# Patient Record
Sex: MALE | Race: BLACK OR AFRICAN AMERICAN | NOT HISPANIC OR LATINO
[De-identification: names, ages, dates, MRNs, and addresses within clinical notes are randomized per-mention and may not be internally consistent; named-entity substitution may affect disease eponyms.]

---

## 2018-03-05 PROBLEM — Z00.00 ENCOUNTER FOR PREVENTIVE HEALTH EXAMINATION: Status: ACTIVE | Noted: 2018-03-05

## 2018-03-06 ENCOUNTER — APPOINTMENT (OUTPATIENT)
Dept: UROLOGY | Facility: CLINIC | Age: 53
End: 2018-03-06

## 2018-04-17 ENCOUNTER — APPOINTMENT (OUTPATIENT)
Dept: UROLOGY | Facility: CLINIC | Age: 53
End: 2018-04-17
Payer: MEDICAID

## 2018-04-17 PROCEDURE — 99204 OFFICE O/P NEW MOD 45 MIN: CPT

## 2018-04-17 RX ORDER — SILDENAFIL 20 MG/1
20 TABLET ORAL
Qty: 50 | Refills: 2 | Status: ACTIVE | COMMUNITY
Start: 2018-04-17 | End: 1900-01-01

## 2018-04-17 RX ORDER — ENALAPRIL MALEATE 10 MG/1
10 TABLET ORAL
Qty: 30 | Refills: 0 | Status: ACTIVE | COMMUNITY
Start: 2018-03-22

## 2018-04-17 RX ORDER — METFORMIN HYDROCHLORIDE 500 MG/1
500 TABLET, COATED ORAL
Qty: 270 | Refills: 0 | Status: ACTIVE | COMMUNITY
Start: 2018-03-28

## 2018-04-17 RX ORDER — TAMSULOSIN HYDROCHLORIDE 0.4 MG/1
0.4 CAPSULE ORAL
Qty: 90 | Refills: 1 | Status: ACTIVE | COMMUNITY
Start: 2018-04-17 | End: 1900-01-01

## 2018-04-17 RX ORDER — ENALAPRIL MALEATE 2.5 MG/1
2.5 TABLET ORAL
Qty: 30 | Refills: 0 | Status: ACTIVE | COMMUNITY
Start: 2018-02-28

## 2018-04-17 RX ORDER — SIMVASTATIN 20 MG/1
20 TABLET, FILM COATED ORAL
Qty: 30 | Refills: 0 | Status: ACTIVE | COMMUNITY
Start: 2018-02-28

## 2018-04-19 LAB
APPEARANCE: CLEAR
BACTERIA: NEGATIVE
BILIRUBIN URINE: NEGATIVE
BLOOD URINE: NEGATIVE
COLOR: YELLOW
GLUCOSE QUALITATIVE U: 1000 MG/DL
HYALINE CASTS: 1 /LPF
KETONES URINE: NEGATIVE
LEUKOCYTE ESTERASE URINE: NEGATIVE
MICROSCOPIC-UA: NORMAL
NITRITE URINE: NEGATIVE
PH URINE: 6.5
PROTEIN URINE: ABNORMAL MG/DL
PSA SERPL-MCNC: 0.34 NG/ML
RED BLOOD CELLS URINE: 4 /HPF
SPECIFIC GRAVITY URINE: 1.03
SQUAMOUS EPITHELIAL CELLS: 2 /HPF
UROBILINOGEN URINE: NEGATIVE MG/DL
WHITE BLOOD CELLS URINE: 1 /HPF

## 2018-05-22 ENCOUNTER — TRANSCRIPTION ENCOUNTER (OUTPATIENT)
Age: 53
End: 2018-05-22

## 2018-05-23 ENCOUNTER — OUTPATIENT (OUTPATIENT)
Dept: OUTPATIENT SERVICES | Facility: HOSPITAL | Age: 53
LOS: 1 days | End: 2018-05-23
Payer: MEDICAID

## 2018-05-23 VITALS
TEMPERATURE: 98 F | HEART RATE: 60 BPM | SYSTOLIC BLOOD PRESSURE: 147 MMHG | RESPIRATION RATE: 18 BRPM | DIASTOLIC BLOOD PRESSURE: 85 MMHG | OXYGEN SATURATION: 100 %

## 2018-05-23 VITALS
OXYGEN SATURATION: 100 % | WEIGHT: 192.9 LBS | HEIGHT: 69 IN | RESPIRATION RATE: 16 BRPM | TEMPERATURE: 99 F | DIASTOLIC BLOOD PRESSURE: 87 MMHG | HEART RATE: 86 BPM | SYSTOLIC BLOOD PRESSURE: 142 MMHG

## 2018-05-23 DIAGNOSIS — K42.9 UMBILICAL HERNIA WITHOUT OBSTRUCTION OR GANGRENE: ICD-10-CM

## 2018-05-23 LAB
GLUCOSE BLDC GLUCOMTR-MCNC: 112 MG/DL — HIGH (ref 70–99)
GLUCOSE BLDC GLUCOMTR-MCNC: 83 MG/DL — SIGNIFICANT CHANGE UP (ref 70–99)

## 2018-05-23 PROCEDURE — 82962 GLUCOSE BLOOD TEST: CPT

## 2018-05-23 PROCEDURE — 49585: CPT

## 2018-05-23 RX ORDER — ACETAMINOPHEN 500 MG
1000 TABLET ORAL ONCE
Qty: 0 | Refills: 0 | Status: DISCONTINUED | OUTPATIENT
Start: 2018-05-23 | End: 2018-05-24

## 2018-05-23 RX ORDER — FENTANYL CITRATE 50 UG/ML
25 INJECTION INTRAVENOUS
Qty: 0 | Refills: 0 | Status: DISCONTINUED | OUTPATIENT
Start: 2018-05-23 | End: 2018-05-24

## 2018-05-23 NOTE — H&P PST ADULT - HISTORY OF PRESENT ILLNESS
54 y/o w/ PMH DM2. HTN, BPH, DLD presents to [facility] for elective surgery for symptomatic umbilical hernia.  Patient states he has been having chronic periumbilical pain for many years and was diagnosed recently with an umbilical hernia. Patient brought CT scan report. Denies currently any abdominal pain, nausea, vomiting, changes in appetite or bowel function.   ALL: NKDA  PMH; As above  PSH: RIH repair

## 2018-05-23 NOTE — ASU DISCHARGE PLAN (ADULT/PEDIATRIC). - MEDICATION SUMMARY - MEDICATIONS TO TAKE
I will START or STAY ON the medications listed below when I get home from the hospital:    oxyCODONE-acetaminophen 5 mg-325 mg oral tablet  -- 1 tab(s) by mouth every 6 hours, As Needed -for moderate pain MDD:4   -- Caution federal law prohibits the transfer of this drug to any person other  than the person for whom it was prescribed.  May cause drowsiness.  Alcohol may intensify this effect.  Use care when operating dangerous machinery.  This prescription cannot be refilled.  This product contains acetaminophen.  Do not use  with any other product containing acetaminophen to prevent possible liver damage.  Using more of this medication than prescribed may cause serious breathing problems.    -- Indication: For Pain    enalapril 10 mg oral tablet  -- 1 tab(s) by mouth once a day  -- Indication: For .    tamsulosin 0.4 mg oral capsule  -- 1 cap(s) by mouth once a day  -- Indication: For .    metFORMIN 1000 mg oral tablet  -- 1 tab(s) by mouth 2 times a day  -- Indication: For .    simvastatin 20 mg oral tablet  -- 1 tab(s) by mouth once a day (at bedtime)  -- Indication: For .    prednisoLONE acetate 1% ophthalmic suspension  -- 1 drop(s) to each affected eye 4 times a day  -- Indication: For .

## 2018-05-23 NOTE — ASU DISCHARGE PLAN (ADULT/PEDIATRIC). - NOTIFY
Persistent Nausea and Vomiting/Unable to Urinate/Bleeding that does not stop/Swelling that continues/Numbness, color, or temperature change to extremity/Excessive Diarrhea/Pain not relieved by Medications/Fever greater than 101/Numbness, tingling

## 2018-05-23 NOTE — BRIEF OPERATIVE NOTE - PROCEDURE
<<-----Click on this checkbox to enter Procedure Umbilical hernia repair with mesh  05/23/2018    Active  MCLERVEUS Umbilical hernia repair  05/23/2018    Active  KIMIUS

## 2018-06-19 ENCOUNTER — APPOINTMENT (OUTPATIENT)
Dept: ORTHOPEDIC SURGERY | Facility: CLINIC | Age: 53
End: 2018-06-19
Payer: MEDICAID

## 2018-06-19 VITALS
BODY MASS INDEX: 29.18 KG/M2 | HEART RATE: 78 BPM | WEIGHT: 197 LBS | SYSTOLIC BLOOD PRESSURE: 130 MMHG | DIASTOLIC BLOOD PRESSURE: 71 MMHG | HEIGHT: 69 IN

## 2018-06-19 DIAGNOSIS — M19.049 PRIMARY OSTEOARTHRITIS, UNSPECIFIED HAND: ICD-10-CM

## 2018-06-19 PROCEDURE — 99203 OFFICE O/P NEW LOW 30 MIN: CPT | Mod: 25

## 2018-06-19 PROCEDURE — 20550 NJX 1 TENDON SHEATH/LIGAMENT: CPT | Mod: RT

## 2018-06-25 ENCOUNTER — APPOINTMENT (OUTPATIENT)
Dept: ORTHOPEDIC SURGERY | Facility: CLINIC | Age: 53
End: 2018-06-25

## 2018-07-26 ENCOUNTER — APPOINTMENT (OUTPATIENT)
Dept: UROLOGY | Facility: CLINIC | Age: 53
End: 2018-07-26
Payer: MEDICAID

## 2018-07-26 VITALS
RESPIRATION RATE: 16 BRPM | BODY MASS INDEX: 29.33 KG/M2 | SYSTOLIC BLOOD PRESSURE: 146 MMHG | DIASTOLIC BLOOD PRESSURE: 72 MMHG | HEART RATE: 74 BPM | HEIGHT: 69 IN | WEIGHT: 198 LBS

## 2018-07-26 PROCEDURE — 99213 OFFICE O/P EST LOW 20 MIN: CPT

## 2018-09-28 ENCOUNTER — APPOINTMENT (OUTPATIENT)
Dept: ORTHOPEDIC SURGERY | Facility: CLINIC | Age: 53
End: 2018-09-28
Payer: MEDICAID

## 2018-09-28 VITALS
HEART RATE: 65 BPM | WEIGHT: 198 LBS | HEIGHT: 69 IN | SYSTOLIC BLOOD PRESSURE: 131 MMHG | DIASTOLIC BLOOD PRESSURE: 78 MMHG | BODY MASS INDEX: 29.33 KG/M2

## 2018-09-28 DIAGNOSIS — M65.311 TRIGGER THUMB, RIGHT THUMB: ICD-10-CM

## 2018-09-28 DIAGNOSIS — M65.321 TRIGGER FINGER, RIGHT INDEX FINGER: ICD-10-CM

## 2018-09-28 PROCEDURE — 99213 OFFICE O/P EST LOW 20 MIN: CPT | Mod: 25

## 2018-09-28 PROCEDURE — 20550 NJX 1 TENDON SHEATH/LIGAMENT: CPT | Mod: RT

## 2018-09-28 RX ORDER — PREDNISONE 10 MG
TABLET ORAL
Refills: 0 | Status: ACTIVE | COMMUNITY

## 2018-09-28 RX ORDER — TAMSULOSIN HYDROCHLORIDE 0.4 MG/1
0.4 CAPSULE ORAL
Refills: 0 | Status: ACTIVE | COMMUNITY

## 2018-09-28 RX ORDER — ASPIRIN 325 MG/1
TABLET, FILM COATED ORAL
Refills: 0 | Status: ACTIVE | COMMUNITY

## 2019-02-26 ENCOUNTER — APPOINTMENT (OUTPATIENT)
Dept: UROLOGY | Facility: CLINIC | Age: 54
End: 2019-02-26

## 2020-08-17 ENCOUNTER — APPOINTMENT (OUTPATIENT)
Dept: DISASTER EMERGENCY | Facility: CLINIC | Age: 55
End: 2020-08-17

## 2020-08-17 DIAGNOSIS — Z01.818 ENCOUNTER FOR OTHER PREPROCEDURAL EXAMINATION: ICD-10-CM

## 2020-08-20 ENCOUNTER — OUTPATIENT (OUTPATIENT)
Dept: OUTPATIENT SERVICES | Facility: HOSPITAL | Age: 55
LOS: 1 days | Discharge: HOME | End: 2020-08-20
Payer: MEDICAID

## 2020-08-20 VITALS
TEMPERATURE: 98 F | DIASTOLIC BLOOD PRESSURE: 72 MMHG | OXYGEN SATURATION: 98 % | SYSTOLIC BLOOD PRESSURE: 142 MMHG | HEIGHT: 69 IN | WEIGHT: 195.11 LBS

## 2020-08-20 VITALS
HEART RATE: 68 BPM | OXYGEN SATURATION: 97 % | DIASTOLIC BLOOD PRESSURE: 68 MMHG | SYSTOLIC BLOOD PRESSURE: 124 MMHG | RESPIRATION RATE: 19 BRPM

## 2020-08-20 DIAGNOSIS — Z98.890 OTHER SPECIFIED POSTPROCEDURAL STATES: Chronic | ICD-10-CM

## 2020-08-20 DIAGNOSIS — R94.39 ABNORMAL RESULT OF OTHER CARDIOVASCULAR FUNCTION STUDY: ICD-10-CM

## 2020-08-20 LAB
ALBUMIN SERPL ELPH-MCNC: 4.9 G/DL — SIGNIFICANT CHANGE UP (ref 3.3–5)
ALP SERPL-CCNC: 83 U/L — SIGNIFICANT CHANGE UP (ref 40–120)
ALT FLD-CCNC: 37 U/L — SIGNIFICANT CHANGE UP (ref 10–45)
ANION GAP SERPL CALC-SCNC: 9 MMOL/L — SIGNIFICANT CHANGE UP (ref 5–17)
AST SERPL-CCNC: 31 U/L — SIGNIFICANT CHANGE UP (ref 10–40)
BILIRUB SERPL-MCNC: 0.4 MG/DL — SIGNIFICANT CHANGE UP (ref 0.2–1.2)
BUN SERPL-MCNC: 13 MG/DL — SIGNIFICANT CHANGE UP (ref 7–23)
CALCIUM SERPL-MCNC: 9.9 MG/DL — SIGNIFICANT CHANGE UP (ref 8.4–10.5)
CHLORIDE SERPL-SCNC: 104 MMOL/L — SIGNIFICANT CHANGE UP (ref 96–108)
CO2 SERPL-SCNC: 30 MMOL/L — SIGNIFICANT CHANGE UP (ref 22–31)
CREAT SERPL-MCNC: 0.92 MG/DL — SIGNIFICANT CHANGE UP (ref 0.5–1.3)
GLUCOSE BLDC GLUCOMTR-MCNC: 157 MG/DL — HIGH (ref 70–99)
GLUCOSE SERPL-MCNC: 156 MG/DL — HIGH (ref 70–99)
HCT VFR BLD CALC: 44.3 % — SIGNIFICANT CHANGE UP (ref 39–50)
HGB BLD-MCNC: 15.2 G/DL — SIGNIFICANT CHANGE UP (ref 13–17)
MCHC RBC-ENTMCNC: 28.9 PG — SIGNIFICANT CHANGE UP (ref 27–34)
MCHC RBC-ENTMCNC: 34.3 GM/DL — SIGNIFICANT CHANGE UP (ref 32–36)
MCV RBC AUTO: 84.2 FL — SIGNIFICANT CHANGE UP (ref 80–100)
NRBC # BLD: 0 /100 WBCS — SIGNIFICANT CHANGE UP (ref 0–0)
PLATELET # BLD AUTO: 155 K/UL — SIGNIFICANT CHANGE UP (ref 150–400)
POTASSIUM SERPL-MCNC: 4.5 MMOL/L — SIGNIFICANT CHANGE UP (ref 3.5–5.3)
POTASSIUM SERPL-SCNC: 4.5 MMOL/L — SIGNIFICANT CHANGE UP (ref 3.5–5.3)
PROT SERPL-MCNC: 8 G/DL — SIGNIFICANT CHANGE UP (ref 6–8.3)
RBC # BLD: 5.26 M/UL — SIGNIFICANT CHANGE UP (ref 4.2–5.8)
RBC # FLD: 13 % — SIGNIFICANT CHANGE UP (ref 10.3–14.5)
SODIUM SERPL-SCNC: 143 MMOL/L — SIGNIFICANT CHANGE UP (ref 135–145)
WBC # BLD: 5.2 K/UL — SIGNIFICANT CHANGE UP (ref 3.8–10.5)
WBC # FLD AUTO: 5.2 K/UL — SIGNIFICANT CHANGE UP (ref 3.8–10.5)

## 2020-08-20 PROCEDURE — 93454 CORONARY ARTERY ANGIO S&I: CPT

## 2020-08-20 PROCEDURE — 93010 ELECTROCARDIOGRAM REPORT: CPT

## 2020-08-20 PROCEDURE — 99152 MOD SED SAME PHYS/QHP 5/>YRS: CPT

## 2020-08-20 PROCEDURE — 93005 ELECTROCARDIOGRAM TRACING: CPT

## 2020-08-20 PROCEDURE — C1769: CPT

## 2020-08-20 PROCEDURE — C1887: CPT

## 2020-08-20 PROCEDURE — 85027 COMPLETE CBC AUTOMATED: CPT

## 2020-08-20 PROCEDURE — C1894: CPT

## 2020-08-20 PROCEDURE — 82962 GLUCOSE BLOOD TEST: CPT

## 2020-08-20 PROCEDURE — 80053 COMPREHEN METABOLIC PANEL: CPT

## 2020-08-20 PROCEDURE — 93454 CORONARY ARTERY ANGIO S&I: CPT | Mod: 26

## 2020-08-20 RX ORDER — AMLODIPINE BESYLATE 2.5 MG/1
1 TABLET ORAL
Qty: 0 | Refills: 0 | DISCHARGE

## 2020-08-20 RX ORDER — TAMSULOSIN HYDROCHLORIDE 0.4 MG/1
1 CAPSULE ORAL
Qty: 0 | Refills: 0 | DISCHARGE

## 2020-08-20 RX ORDER — SIMVASTATIN 20 MG/1
1 TABLET, FILM COATED ORAL
Qty: 0 | Refills: 0 | DISCHARGE

## 2020-08-20 RX ORDER — SITAGLIPTIN 50 MG/1
1 TABLET, FILM COATED ORAL
Qty: 0 | Refills: 0 | DISCHARGE

## 2020-08-20 RX ORDER — METFORMIN HYDROCHLORIDE 850 MG/1
1 TABLET ORAL
Qty: 0 | Refills: 0 | DISCHARGE

## 2020-08-20 RX ORDER — ASPIRIN/CALCIUM CARB/MAGNESIUM 324 MG
0 TABLET ORAL
Qty: 0 | Refills: 0 | DISCHARGE

## 2020-08-20 RX ORDER — PREDNISOLONE SODIUM PHOSPHATE 1 %
1 DROPS OPHTHALMIC (EYE)
Qty: 0 | Refills: 0 | DISCHARGE

## 2020-08-20 RX ORDER — ATORVASTATIN CALCIUM 80 MG/1
1 TABLET, FILM COATED ORAL
Qty: 0 | Refills: 0 | DISCHARGE

## 2020-08-20 NOTE — ASU DISCHARGE PLAN (ADULT/PEDIATRIC) - ASU DC SPECIAL INSTRUCTIONSFT
Wound Care:   the day AFTER your procedure remove bandage GENTLY, and clean using  mild soap and gentle warm, water stream, pat dry. leave OPEN to air. YOU MAY SHOWER   DO NOT apply lotions, creams, ointments, powder, perfumes to your incision site  DO NOT SOAK your site for 1 week ( no baths, no pools, no tubs, etc...)  Check  your groin and /or wrist daily. A small amount of bruising, and soreness is expected.     ACTIVITY: for 24 hours   - DO NOT DRIVE  - DO NOT make any important decisions or sign legal documents   - DO NOT operate heavy machineries   - you may resume sexual activity in 48 hours, unless otherwise instructed by your cardiologist     If your procedure was done through the WRIST: for the NEXT 3DAYS:  - avoid pushing, pulling, with that affected wrist   - avoid repeated movement of that hand and wrist ( e.g: typing, hammering)  - DO NOT LIFT anything more than 5 lbs     If your procedure was done through the GROIN: for the NEXT 5 DAYS  - Limit climbing stairs, DO NOT soak in bathtub or pool  - no strenuous activities, pushing, pulling, straining  - Do not lift anything 10lbs or heavier     MEDICATION:   take your medications as explained ( see discharge paperwork)   If you received a STENT, you will be taking antiplatelet medications to KEEP YOUR STENT OPEN ( e.g: Aspirin, Plavix, Brilinta, Effient, etc).  Take as prescribed DO NOT STOP taking them without consulting with your cardiologist first.     Follow heart healthy diet recommended by your doctor, , if you smoke STOP SMOKING ( may call [phone] for center of tobacco control if you need assistance)     CALL your doctor to make appointment in 2 WEEKS     ***CALL YOUR DOCTOR***  if you experience: fever, chills, body aches, or severe pain, swelling, redness, heat or yellow discharge at incision site  If you experience Bleeding or excruciating pain at the procedural site, swelling ( golf ball size) at your procedural site  If you experience CHEST PAIN  If you experience extremity numbness, tingling, temperature change ( of your procedural site)   If you are unable to reach your doctor, you may contact:   -Cardiology Office at [facility] at [phone] or   - [facility] [phone]  - [facility] [phone]

## 2020-08-20 NOTE — H&P CARDIOLOGY - HISTORY OF PRESENT ILLNESS
54 y/o w/ PMH DM2 (does not know A1C). HTN, HLD, BPH who presented to cardiology, Dr. Tinoco for exertional chest pain........................NST 8/1/2020 inferapical/septal partially reversible defect.  EF 65%.  Denies SOB/MUÑOZ, dizziness, diaphoresis, palpitations, nausea, vomiting, peripheral edema, recent weight gain, or syncope.  No implanted monitoring devices. Pt. presents for further evaluation and cardiac cath. 54 y/o w/ PMH DM2 (does not know A1C). HTN, HLD, BPH who presented to cardiology, Dr. Tinoco for worsening episodes of exertional chest pain radiating to left neck and dizziness over past year that last a few minutes and resolve with rest.  Denies SOB/MUÑOZ, diaphoresis, palpitations, nausea, vomiting, peripheral edema, recent weight gain, or syncope. NST 8/1/2020 inferapical/septal partially reversible defect.  EF 65%.  Denies SOB/MUÑOZ, dizziness, diaphoresis, palpitations, nausea, vomiting, peripheral edema, recent weight gain, or syncope.  No implanted monitoring devices. Pt. presents for further evaluation and cardiac cath. COVID negative 8/17/2020. 54 y/o w/ PMH DM2 (does not know A1C). HTN, HLD, BPH who presented to cardiology, Dr. Tinoco for worsening episodes of exertional chest pain radiating to left neck and dizziness over past year that last a few minutes and resolve with rest.  Denies SOB/MUÑOZ, diaphoresis, palpitations, nausea, vomiting, peripheral edema, recent weight gain, or syncope. NST 8/1/2020 inferapical/septal partially reversible defect.  EF 65%.  Denies SOB/MUÑOZ, dizziness, diaphoresis, palpitations, nausea, vomiting, peripheral edema, recent weight gain, or syncope.  No implanted monitoring devices. Pt. presents for further evaluation and cardiac cath. COVID negative 8/17/2020.    Symptoms:        Angina (Class): II       Ischemic Symptoms: chest pain, dizziness  Heart Failure:        Systolic/Diastolic/Combined:        NYHA Class (within 2 weeks):   Assessment of LVEF:       EF: 65       Assessed by: NST       Date:   Prior Cardiac Interventions:       PCI's: n/a       CABG: n/a    Noninvasive Testing: see HPI  Stress Test: Date:        Protocol:        Duration of Exercise:        Symptoms:        EKG Changes:        DTS:        Myocardial Imaging:        Risk Assessment:   Echo: n/a  Antianginal Therapies:        Beta Blockers:         Calcium Channel Blockers: AMlodipine       Long Acting Nitrates:        Ranexa:   Associated Risk Factors:        Cerebrovascular Disease: N/A       Chronic Lung Disease: N/A       Peripheral Arterial Disease: N/A       Chronic Kidney Disease (if yes, what is GFR): N/A       Uncontrolled Diabetes (if yes, what is HgbA1C or FBS): N/A       Poorly Controlled Hypertension (if yes, what is SBP): N/A       Morbid Obesity (if yes, what is BMI): N/A       History of Recent Ventricular Arrhythmia: N/A       Inability to Ambulate Safely: N/A       Need for Therapeutic Anticoagulation: N/A       Antiplatelet or Contrast Allergy: N/A

## 2020-08-20 NOTE — H&P CARDIOLOGY - PMH
BPH (benign prostatic hyperplasia)    Chest pain    Diabetes mellitus    HLD (hyperlipidemia)    Hypertension

## 2020-08-20 NOTE — ASU DISCHARGE PLAN (ADULT/PEDIATRIC) - CALL YOUR DOCTOR IF YOU HAVE ANY OF THE FOLLOWING:
Swelling that gets worse/Fever greater than (need to indicate Fahrenheit or Celsius)/Bleeding that does not stop/Pain not relieved by Medications

## 2021-02-12 PROBLEM — E78.5 HYPERLIPIDEMIA, UNSPECIFIED: Chronic | Status: ACTIVE | Noted: 2020-08-20

## 2021-02-12 PROBLEM — E11.9 TYPE 2 DIABETES MELLITUS WITHOUT COMPLICATIONS: Chronic | Status: ACTIVE | Noted: 2020-08-20

## 2021-02-12 PROBLEM — N40.0 BENIGN PROSTATIC HYPERPLASIA WITHOUT LOWER URINARY TRACT SYMPTOMS: Chronic | Status: ACTIVE | Noted: 2020-08-20

## 2021-02-12 PROBLEM — I10 ESSENTIAL (PRIMARY) HYPERTENSION: Chronic | Status: ACTIVE | Noted: 2020-08-20

## 2021-02-12 PROBLEM — R07.9 CHEST PAIN, UNSPECIFIED: Chronic | Status: ACTIVE | Noted: 2020-08-20

## 2021-03-30 ENCOUNTER — APPOINTMENT (OUTPATIENT)
Dept: UROLOGY | Facility: CLINIC | Age: 56
End: 2021-03-30

## 2021-04-14 ENCOUNTER — APPOINTMENT (OUTPATIENT)
Dept: UROLOGY | Facility: CLINIC | Age: 56
End: 2021-04-14

## 2021-05-12 ENCOUNTER — APPOINTMENT (OUTPATIENT)
Dept: UROLOGY | Facility: CLINIC | Age: 56
End: 2021-05-12
Payer: MEDICAID

## 2021-05-12 VITALS
HEART RATE: 80 BPM | BODY MASS INDEX: 29.33 KG/M2 | WEIGHT: 198 LBS | DIASTOLIC BLOOD PRESSURE: 80 MMHG | SYSTOLIC BLOOD PRESSURE: 151 MMHG | HEIGHT: 69 IN | RESPIRATION RATE: 18 BRPM | TEMPERATURE: 97.8 F

## 2021-05-12 LAB — SARS-COV-2 N GENE NPH QL NAA+PROBE: NOT DETECTED

## 2021-05-12 PROCEDURE — 99214 OFFICE O/P EST MOD 30 MIN: CPT

## 2021-05-12 PROCEDURE — 99072 ADDL SUPL MATRL&STAF TM PHE: CPT

## 2021-05-12 PROCEDURE — 88112 CYTOPATH CELL ENHANCE TECH: CPT | Mod: 26

## 2021-05-12 RX ORDER — FLUCONAZOLE 100 MG/1
100 TABLET ORAL
Qty: 11 | Refills: 0 | Status: ACTIVE | COMMUNITY
Start: 2021-05-12 | End: 1900-01-01

## 2021-05-12 RX ORDER — CLOTRIMAZOLE AND BETAMETHASONE DIPROPIONATE 10; .5 MG/G; MG/G
1-0.05 CREAM TOPICAL TWICE DAILY
Qty: 1 | Refills: 2 | Status: ACTIVE | COMMUNITY
Start: 2021-05-12 | End: 1900-01-01

## 2021-05-12 NOTE — HISTORY OF PRESENT ILLNESS
[FreeTextEntry1] : 05/12/2021: Mr. Schulte is a 55y/o M presents today for evaluation of tightening of foreskin. Previously saw Dr. Trinidad in 2018. Today complains of tightening of skin when he has an erection. Hx of diabetes, on Metformin. Had used Miconazole cream and Fluconazole 150mg in the past. Used Sildenafil which helps with erections. Patient was once prescribed Tamsulosin but never started. Notes over four years ago, he once saw blood in his urine and passed a stone, but has not had another episode since then. Notes he sometimes has pressure on his left flank. Also sometimes unable to hold his urine. Had an episode of fainting and and had negative cardiac workup. No FHx of prostate cancer or kidney stones. 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

## 2021-05-12 NOTE — ASSESSMENT
[FreeTextEntry1] : 05/12/2021: Mr. Schulte is a 57y/o M presents today for evaluation of tightening of foreskin. Previously saw Dr. Trinidad in 2018. Today complains of tightening of skin when he has an erection. Hx of diabetes, on Metformin. Had used Miconazole cream and Fluconazole 150mg in the past. Used Sildenafil which helps with erections. Patient was once prescribed Tamsulosin but never started. Notes over four years ago, he once saw blood in his urine and passed a stone, but has not had another episode since then. Notes he sometimes has pressure on his left flank. Also sometimes unable to hold his urine. Had an episode of fainting and and had negative cardiac workup. No FHx of prostate cancer or kidney stones. 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 \par \par Instructed to practice foreskin hygiene. I prescribed the patient Clotrimazole-Betamethasone cream to apply BID to the affected area. I prescribed the patient Fluconazole 100mg, one tablet twice the first day and then one tablet once daily until finished. If there is no improvement, advised to go for circumcision. \par \par I prescribed the patient Silodosin 8mg, one capsule once a day with food for his urinary symptoms. I informed the patient about nasal congestion and lack of seminal emission as side effects. \par \par The patient produced a urine sample which will be sent for urinalysis, urine cytology, and urine culture. \par \par RTO in 2 weeks for reassessment. \par \par Preparation, in-person office visit, and coordination of care took: 30 minutes \par \par

## 2021-05-12 NOTE — REVIEW OF SYSTEMS
[Negative] : Heme/Lymph [Painful [place]] : painful [place] [Genital bacterial infection] : genital bacterial infection

## 2021-05-12 NOTE — ADDENDUM
[FreeTextEntry1] : I, Jeanne Skinenrin, acted solely as a scribe for Dr. Anuel Medeiros on this date 05/12/2021.\par \par All medical record entries made by the Scribe were at my, Dr. Anuel Medeiros, direction and personally dictated by me on 05/12/2021. I have reviewed the chart and agree that the record accurately reflects my personal performance of the history, physical exam, assessment and plan.  I have also personally directed, reviewed and agreed with the chart.

## 2021-05-12 NOTE — PHYSICAL EXAM
[General Appearance - Well Developed] : well developed [Normal Appearance] : normal appearance [General Appearance - In No Acute Distress] : no acute distress [Abdomen Soft] : soft [Abdomen Tenderness] : non-tender [Edema] : no peripheral edema [] : no respiratory distress [Respiration, Rhythm And Depth] : normal respiratory rhythm and effort [Exaggerated Use Of Accessory Muscles For Inspiration] : no accessory muscle use [Oriented To Time, Place, And Person] : oriented to person, place, and time [Affect] : the affect was normal [Mood] : the mood was normal [Not Anxious] : not anxious [Normal Station and Gait] : the gait and station were normal for the patient's age [No Focal Deficits] : no focal deficits [FreeTextEntry1] : uncircumcised. mild phimosis.

## 2021-05-13 ENCOUNTER — NON-APPOINTMENT (OUTPATIENT)
Age: 56
End: 2021-05-13

## 2021-05-13 LAB
ALP BLD-CCNC: 104 U/L
ANION GAP SERPL CALC-SCNC: 12 MMOL/L
APPEARANCE: CLEAR
BACTERIA: NEGATIVE
BILIRUBIN URINE: NEGATIVE
BLOOD URINE: NEGATIVE
BUN SERPL-MCNC: 12 MG/DL
CALCIUM SERPL-MCNC: 9.4 MG/DL
CHLORIDE SERPL-SCNC: 100 MMOL/L
CO2 SERPL-SCNC: 27 MMOL/L
COLOR: YELLOW
CREAT SERPL-MCNC: 0.82 MG/DL
ESTIMATED AVERAGE GLUCOSE: 263 MG/DL
GLUCOSE QUALITATIVE U: ABNORMAL
GLUCOSE SERPL-MCNC: 214 MG/DL
HBA1C MFR BLD HPLC: 10.8 %
HYALINE CASTS: 0 /LPF
KETONES URINE: NEGATIVE
LEUKOCYTE ESTERASE URINE: NEGATIVE
MICROSCOPIC-UA: NORMAL
NITRITE URINE: NEGATIVE
PH URINE: 6
POTASSIUM SERPL-SCNC: 4.5 MMOL/L
PROTEIN URINE: NORMAL
PSA SERPL-MCNC: 0.27 NG/ML
RED BLOOD CELLS URINE: 1 /HPF
SODIUM SERPL-SCNC: 138 MMOL/L
SPECIFIC GRAVITY URINE: 1.03
SQUAMOUS EPITHELIAL CELLS: 1 /HPF
UROBILINOGEN URINE: NORMAL
WHITE BLOOD CELLS URINE: 1 /HPF

## 2021-05-15 LAB
BACTERIA UR CULT: NORMAL
TESTOST BND SERPL-MCNC: 8.2 PG/ML
TESTOST SERPL-MCNC: 307.2 NG/DL

## 2021-05-17 LAB — URINE CYTOLOGY: NORMAL

## 2021-08-23 ENCOUNTER — APPOINTMENT (OUTPATIENT)
Dept: UROLOGY | Facility: CLINIC | Age: 56
End: 2021-08-23

## 2021-09-21 ENCOUNTER — NON-APPOINTMENT (OUTPATIENT)
Age: 56
End: 2021-09-21

## 2021-09-22 ENCOUNTER — APPOINTMENT (OUTPATIENT)
Dept: UROLOGY | Facility: CLINIC | Age: 56
End: 2021-09-22
Payer: MEDICAID

## 2021-09-22 VITALS — HEART RATE: 65 BPM | DIASTOLIC BLOOD PRESSURE: 89 MMHG | SYSTOLIC BLOOD PRESSURE: 147 MMHG | RESPIRATION RATE: 16 BRPM

## 2021-09-22 DIAGNOSIS — N40.0 BENIGN PROSTATIC HYPERPLASIA WITHOUT LOWER URINARY TRACT SYMPMS: ICD-10-CM

## 2021-09-22 DIAGNOSIS — E11.9 TYPE 2 DIABETES MELLITUS W/OUT COMPLICATIONS: ICD-10-CM

## 2021-09-22 PROCEDURE — 99214 OFFICE O/P EST MOD 30 MIN: CPT

## 2021-09-22 RX ORDER — SILODOSIN 8 MG/1
8 CAPSULE ORAL
Qty: 90 | Refills: 3 | Status: ACTIVE | COMMUNITY
Start: 2021-05-12 | End: 1900-01-01

## 2021-09-22 NOTE — ADDENDUM
[FreeTextEntry1] : I, Jeanne Skinnerin, acted solely as a scribe for Dr. Anuel Medeiros on this date 09/22/2021.\par \par All medical record entries made by the Scribe were at my, Dr. Anuel Medeiros, direction and personally dictated by me on 09/22/2021. I have reviewed the chart and agree that the record accurately reflects my personal performance of the history, physical exam, assessment and plan.  I have also personally directed, reviewed and agreed with the chart.

## 2021-09-22 NOTE — ASSESSMENT
[FreeTextEntry1] : 05/12/2021: Mr. Schulte is a 55y/o M presents today for evaluation of tightening of foreskin. Previously saw Dr. Trinidad in 2018. Today complains of tightening of skin when he has an erection. Hx of diabetes, on Metformin. Had used Miconazole cream and Fluconazole 150mg in the past. Used Sildenafil which helps with erections. Patient was once prescribed Tamsulosin but never started. Notes over four years ago, he once saw blood in his urine and passed a stone, but has not had another episode since then. Notes he sometimes has pressure on his left flank. Also sometimes unable to hold his urine. Had an episode of fainting and and had negative cardiac workup. No FHx of prostate cancer or kidney stones. 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 \par \par 09/22/2021: Patient presents today for follow up. Last visit, patient was given Silodosin once daily with meals which has improved his urination. Previously had urinary urge leakage and frequency which has resolved. Erections and sexual desire are poor. Admits his diabetes is not controlled and currently taking insulin. Last HbA1c 5/12/21 was 10.8. \par \par I explained the risks of uncontrolled diabetes including vision and the ability to have erections, and that he should have it better controlled with diet and exercise. \par \par Phimosis has resolved and patient may stop using Clotrimazone cream. He may restart if phimosis develops again. \par \par The patient produced a urine sample which will be sent for urinalysis, urine cytology, and urine culture. \par Blood work today includes HbA1c, blood urea nitrogen, creatinine, electrolyte panel, glucose RC client gray top, androstenedione, dehydroepiandrosterone, dehydroepiandrosterone-sulfate, dihydrotestosterone, estradiol, estrogen, FSH, LH, progesterone, prolactin, SHBG, PSA, and testosterone. \par \par Patient will schedule telehealth appointment in 1 week for evaluation. \par \par Preparation, in-person office visit, and coordination of care took: 30 minutes

## 2021-09-22 NOTE — PHYSICAL EXAM
[General Appearance - Well Developed] : well developed [Normal Appearance] : normal appearance [General Appearance - In No Acute Distress] : no acute distress [Abdomen Soft] : soft [Abdomen Tenderness] : non-tender [Edema] : no peripheral edema [] : no respiratory distress [Respiration, Rhythm And Depth] : normal respiratory rhythm and effort [Exaggerated Use Of Accessory Muscles For Inspiration] : no accessory muscle use [Oriented To Time, Place, And Person] : oriented to person, place, and time [Affect] : the affect was normal [Mood] : the mood was normal [Not Anxious] : not anxious [Normal Station and Gait] : the gait and station were normal for the patient's age [No Focal Deficits] : no focal deficits [FreeTextEntry1] : foreskin retracts easily

## 2021-09-22 NOTE — HISTORY OF PRESENT ILLNESS
[FreeTextEntry1] : 05/12/2021: Mr. Schulte is a 55y/o M presents today for evaluation of tightening of foreskin. Previously saw Dr. Trinidad in 2018. Today complains of tightening of skin when he has an erection. Hx of diabetes, on Metformin. Had used Miconazole cream and Fluconazole 150mg in the past. Used Sildenafil which helps with erections. Patient was once prescribed Tamsulosin but never started. Notes over four years ago, he once saw blood in his urine and passed a stone, but has not had another episode since then. Notes he sometimes has pressure on his left flank. Also sometimes unable to hold his urine. Had an episode of fainting and and had negative cardiac workup. No FHx of prostate cancer or kidney stones. 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 \par \par 09/22/2021: Patient presents today for follow up. Last visit, patient was given Silodosin once daily with meals which has improved his urination. Previously had urinary urge leakage and frequency which has resolved. Erections and sexual desire are poor. Admits his diabetes is not controlled and currently taking insulin. Last HbA1c 5/12/21 was 10.8.

## 2021-09-22 NOTE — REVIEW OF SYSTEMS
[Painful [place]] : painful [place] [Genital bacterial infection] : genital bacterial infection [Negative] : Heme/Lymph

## 2021-09-25 LAB
APPEARANCE: CLEAR
BACTERIA UR CULT: NORMAL
BACTERIA: NEGATIVE
BILIRUBIN URINE: NEGATIVE
BLOOD URINE: NEGATIVE
COLOR: YELLOW
GLUCOSE QUALITATIVE U: ABNORMAL
HYALINE CASTS: 0 /LPF
KETONES URINE: NEGATIVE
LEUKOCYTE ESTERASE URINE: NEGATIVE
MICROSCOPIC-UA: NORMAL
NITRITE URINE: NEGATIVE
PH URINE: 6
PROTEIN URINE: NORMAL
PSA FREE FLD-MCNC: 7 %
PSA FREE SERPL-MCNC: 0.04 NG/ML
PSA SERPL-MCNC: 0.52 NG/ML
RED BLOOD CELLS URINE: 1 /HPF
SPECIFIC GRAVITY URINE: 1.03
SQUAMOUS EPITHELIAL CELLS: 0 /HPF
URINE CYTOLOGY: NORMAL
UROBILINOGEN URINE: NORMAL
WHITE BLOOD CELLS URINE: 1 /HPF

## 2021-09-29 ENCOUNTER — APPOINTMENT (OUTPATIENT)
Dept: UROLOGY | Facility: CLINIC | Age: 56
End: 2021-09-29
Payer: MEDICAID

## 2021-09-29 PROCEDURE — 99443: CPT | Mod: 95

## 2021-09-29 NOTE — REVIEW OF SYSTEMS
[Painful [place]] : painful [place] [Genital bacterial infection] : genital bacterial infection [Negative] : Heme/Lymph

## 2021-09-29 NOTE — HISTORY OF PRESENT ILLNESS
Chief complaint:   Chief Complaint   Patient presents with   • Follow-up       Vitals:  Visit Vitals  /72   Pulse 80   Resp 16   Ht 5' 6\" (1.676 m)   Wt 80.5 kg   BMI 28.64 kg/m²       HISTORY OF PRESENT ILLNESS     Patient is being seen to discuss ADHD, follow-up medical care including asthma, and occasional headaches. The patient's medical diagnosis and treatment were reviewed and discussed.     Patient reports situational stressors with family life and work. She reports as a child she was hyperactive. The patient feels like she does procrastinate a lot and is unable to complete tasks, has no initiative to get things done.  Discussion about different treatment and medications for ADHD. Educated on Vyvanse and the side effects, risks and benefits and how often the medication can be disbursed.  The patient does have  thyroid fullness and palpable small nodule therefore an ultrasound will be ordered.   Laboratory tests need to be done.  Patient medical history and social history have been reviewed and updated.          Other significant problems:  Patient Active Problem List    Diagnosis Date Noted   • Attention deficit hyperactivity disorder (ADHD), combined type 06/19/2018     Priority: Low   • Mild intermittent asthma without complication 06/19/2018     Priority: Low   • Thyromegaly 06/19/2018     Priority: Low   • Healthy adult on routine physical examination 09/09/2013     Priority: Low   • Asthma 12/03/2012     Priority: Low       PAST MEDICAL, FAMILY AND SOCIAL HISTORY     Medications:  Current Outpatient Prescriptions   Medication   • albuterol (PROAIR HFA) 108 (90 Base) MCG/ACT inhaler   • Misc Natural Products TABS   • [START ON 6/22/2018] lisdexamfetamine (VYVANSE) 30 MG capsule     No current facility-administered medications for this visit.        Allergies:  ALLERGIES:   Allergen Reactions   • Aspirin SWELLING       Past Medical  History/Surgeries:  Past Medical History:   Diagnosis Date   •  Asthma        Past Surgical History:   Procedure Laterality Date   •  section, low transverse     • Tonsillectomy and adenoidectomy         Family History:  Family History   Problem Relation Age of Onset   • Cancer Mother    • Kidney disease Mother        Social History:  Social History   Substance Use Topics   • Smoking status: Never Smoker   • Smokeless tobacco: Never Used   • Alcohol use 1.2 - 1.8 oz/week     2 - 3 Standard drinks or equivalent per week      Comment: Socially       REVIEW OF SYSTEMS     Review of Systems   Constitutional: Negative.  Negative for activity change, appetite change, chills, diaphoresis, fatigue, fever and unexpected weight change.   HENT: Negative.    Eyes: Negative.    Respiratory: Negative.  Negative for cough, choking, chest tightness, shortness of breath, wheezing and stridor.    Cardiovascular: Negative.  Negative for chest pain, palpitations and leg swelling.   Gastrointestinal: Negative.  Negative for abdominal distention, abdominal pain, anal bleeding, blood in stool, constipation, diarrhea, nausea and vomiting.   Endocrine: Negative.  Negative for cold intolerance, heat intolerance, polydipsia, polyphagia and polyuria.   Genitourinary: Negative.    Musculoskeletal: Negative.  Negative for arthralgias, back pain, gait problem, joint swelling and myalgias.   Skin: Negative for color change, pallor and rash.   Allergic/Immunologic: Positive for environmental allergies.   Neurological: Positive for headaches. Negative for dizziness, tremors, seizures, syncope, speech difficulty, weakness, light-headedness and numbness.        Very rare headaches treated with Tylenol and Advil. No neurological changes   Hematological: Negative.  Negative for adenopathy. Does not bruise/bleed easily.   Psychiatric/Behavioral: Negative for agitation, behavioral problems, confusion, decreased concentration, dysphoric mood, hallucinations and sleep disturbance. The patient is hyperactive.  The patient is not nervous/anxious.        PHYSICAL EXAM     Physical Exam   Constitutional: She is oriented to person, place, and time. She appears well-developed and well-nourished. No distress.   HENT:   Head: Normocephalic and atraumatic.   Right Ear: External ear normal.   Left Ear: External ear normal.   Nose: Nose normal.   Mouth/Throat: Oropharynx is clear and moist.   Eyes: Conjunctivae and EOM are normal. Right eye exhibits no discharge. Left eye exhibits no discharge. No scleral icterus.   Neck: Normal range of motion. Neck supple. No JVD present. No tracheal deviation present. Thyromegaly present.   There is thyroid fullness and a small nodules palpable   Cardiovascular: Normal rate, regular rhythm, normal heart sounds and intact distal pulses.  Exam reveals no gallop and no friction rub.    No murmur heard.  Pulmonary/Chest: Effort normal and breath sounds normal. No stridor. No respiratory distress. She has no wheezes. She has no rales.   Abdominal: Soft. Bowel sounds are normal. She exhibits no distension. There is no tenderness.   Musculoskeletal: Normal range of motion. She exhibits no edema or tenderness.   Lymphadenopathy:     She has no cervical adenopathy.   Neurological: She is alert and oriented to person, place, and time. She exhibits normal muscle tone. Coordination normal.   Skin: Skin is warm and dry. No rash noted. She is not diaphoretic. No erythema.   Psychiatric: She has a normal mood and affect. Her behavior is normal. Judgment and thought content normal.   Vitals reviewed.  Aberrant (been ordered    ASSESSMENT/PLAN   Patient seen for follow up medical care, medical diagnoses and treatment reviewed and discussed. Patient with history of ADHD. Treatment options reviewed and discussed. Side effects of medications as well.  ADHD-Vyvanse 30 mg In the am. Do not take Vyvanse right away, need to have labs first. Has an inability to assess other medical problems  Thyroid fullness-Will order  [FreeTextEntry1] : 05/12/2021: Mr. Schulte is a 57y/o M presents today for evaluation of tightening of foreskin. Previously saw Dr. Trinidad in 2018. Today complains of tightening of skin when he has an erection. Hx of diabetes, on Metformin. Had used Miconazole cream and Fluconazole 150mg in the past. Used Sildenafil which helps with erections. Patient was once prescribed Tamsulosin but never started. Notes over four years ago, he once saw blood in his urine and passed a stone, but has not had another episode since then. Notes he sometimes has pressure on his left flank. Also sometimes unable to hold his urine. Had an episode of fainting and and had negative cardiac workup. No FHx of prostate cancer or kidney stones. 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 \par \par 09/22/2021: Patient presents today for follow up. Last visit, patient was given Silodosin once daily with meals which has improved his urination. Previously had urinary urge leakage and frequency which has resolved. Erections and sexual desire are poor. Admits his diabetes is not controlled and currently taking insulin. Last HbA1c 5/12/21 was 10.8. \par \par 09/29/2021: The patient gave permission for a telephone visit. Scheduled as TEB but was not waiting in [facility] room for me, so I called on landline and told me he prefers telephone visit. I reviewed lab results from 9/22/21 visit. I explained the results in detail. His HbA1c 5/12/21 was 10.8, but HbA1c by Dr. Tinoco 8/16/21 had improved to 8.6 showing there is still opportunity for further improvement. 9/22/21 UA showed 500 mg/dL glucose. Patient at the time was not on any meds that affected glucose transport. I discussed better management of diabetes and asked he discuss with his PCP which he agrees. Urine culture showed no significant growth. Urine cytology negative for high grade urothelial carcinoma. PSA was 0.52, not on Finasteride or Dutasteride. No hx of smoking or tobacco use. No FHx of prostate cancer. Patient is from [place]. Urination during the day is good. Currently takes Silodosin 8mg, however still wakes 10-11x at night to urinate. He states he drinks a lot of water in the evenings. an ultrasound.  Other comorbidities reviewed and plan of care discussed. Continue treatment as ordered.   Recheck in 4- weeks sooner if necessary. Labs prior to appointment.   On 6/19/2018, ILaura scribed the services personally performed by Del Sawant MD  The documentation recorded by the scribe accurately and completely reflects the service(s) I personally performed and the decisions made by me.

## 2021-09-29 NOTE — ASSESSMENT
[FreeTextEntry1] : 05/12/2021: Mr. Schulte is a 55y/o M presents today for evaluation of tightening of foreskin. Previously saw Dr. Trinidad in 2018. Today complains of tightening of skin when he has an erection. Hx of diabetes, on Metformin. Had used Miconazole cream and Fluconazole 150mg in the past. Used Sildenafil which helps with erections. Patient was once prescribed Tamsulosin but never started. Notes over four years ago, he once saw blood in his urine and passed a stone, but has not had another episode since then. Notes he sometimes has pressure on his left flank. Also sometimes unable to hold his urine. Had an episode of fainting and and had negative cardiac workup. No FHx of prostate cancer or kidney stones. 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 \par \par 09/22/2021: Patient presents today for follow up. Last visit, patient was given Silodosin once daily with meals which has improved his urination. Previously had urinary urge leakage and frequency which has resolved. Erections and sexual desire are poor. Admits his diabetes is not controlled and currently taking insulin. Last HbA1c 5/12/21 was 10.8. \par \par 09/29/2021: The patient gave permission for a telephone visit. Scheduled as TEB but was not waiting in [facility] room for me, so I called on landline and told me he prefers telephone visit. I reviewed lab results from 9/22/21 visit. I explained the results in detail. His HbA1c 5/12/21 was 10.8, but HbA1c by Dr. Tinoco 8/16/21 had improved to 8.6 showing there is still opportunity for further improvement. 9/22/21 UA showed 500 mg/dL glucose. Patient at the time was not on any meds that affected glucose transport. I discussed better management of diabetes and asked he discuss with his PCP which he agrees. Urine culture showed no significant growth. Urine cytology negative for high grade urothelial carcinoma. PSA was 0.52, not on Finasteride or Dutasteride. No hx of smoking or tobacco use. No FHx of prostate cancer. Patient is from [place]. Urination during the day is good. Currently takes Silodosin 8mg, however still wakes 10-11x at night to urinate. He states he drinks a lot of water in the evenings.\par \par I prescribed the patient Desmopressin 0.1 mg, one tablet once daily, for nocturia. I informed the patient there is a side effect of dilute blood which we will monitor with blood tests. I asked him to reduce fluid intake at night and increase during the day. I asked him to go for blood work including BMP and serum osmolality which I would like repeated next week and he should start Desmopressin this week or tomorrow. I asked him to schedule a telehealth visit a week from this Friday which would be 10/9. Patient and wife were both on the phone call visit and I explained we may need to titrate Desmopressin to a higher dose. \par \par Preparation, telehealth visit, and coordination of care took: 30 minutes

## 2021-09-29 NOTE — ADDENDUM
[FreeTextEntry1] : I, Jeanne Skinnerin, acted solely as a scribe for Dr. Anuel Medeiros on this date 09/29/2021.\par \par All medical record entries made by the Scribe were at my, Dr. Anuel Medeiros, direction and personally dictated by me on 09/29/2021. I have reviewed the chart and agree that the record accurately reflects my personal performance of the history, physical exam, assessment and plan.  I have also personally directed, reviewed and agreed with the chart.

## 2021-10-03 ENCOUNTER — NON-APPOINTMENT (OUTPATIENT)
Age: 56
End: 2021-10-03

## 2021-10-03 LAB
ANION GAP SERPL CALC-SCNC: 9 MMOL/L
APPEARANCE: CLEAR
BACTERIA: NEGATIVE
BILIRUBIN URINE: NEGATIVE
BLOOD URINE: NEGATIVE
BUN SERPL-MCNC: 12 MG/DL
CALCIUM SERPL-MCNC: 9.6 MG/DL
CHLORIDE SERPL-SCNC: 102 MMOL/L
CO2 SERPL-SCNC: 28 MMOL/L
COLOR: YELLOW
CREAT SERPL-MCNC: 0.88 MG/DL
GLUCOSE QUALITATIVE U: ABNORMAL
GLUCOSE SERPL-MCNC: 267 MG/DL
HYALINE CASTS: 0 /LPF
KETONES URINE: NEGATIVE
LEUKOCYTE ESTERASE URINE: NEGATIVE
MICROSCOPIC-UA: NORMAL
NITRITE URINE: NEGATIVE
OSMOLALITY SERPL: 300 MOSMOL/KG
OSMOLALITY UR: 985 MOSM/KG
PH URINE: 6
POTASSIUM SERPL-SCNC: 5.1 MMOL/L
PROTEIN URINE: NORMAL
RED BLOOD CELLS URINE: 1 /HPF
SODIUM SERPL-SCNC: 139 MMOL/L
SPECIFIC GRAVITY URINE: 1.03
SQUAMOUS EPITHELIAL CELLS: 0 /HPF
UROBILINOGEN URINE: NORMAL
WHITE BLOOD CELLS URINE: 1 /HPF

## 2021-10-06 ENCOUNTER — APPOINTMENT (OUTPATIENT)
Dept: UROLOGY | Facility: CLINIC | Age: 56
End: 2021-10-06
Payer: MEDICAID

## 2021-10-06 DIAGNOSIS — N47.1 PHIMOSIS: ICD-10-CM

## 2021-10-06 PROCEDURE — 99443: CPT

## 2021-10-10 PROBLEM — N47.1 PHIMOSIS OF PENIS: Status: ACTIVE | Noted: 2021-05-12

## 2021-10-10 NOTE — ASSESSMENT
[FreeTextEntry1] : 05/12/2021: Mr. Schulte is a 57y/o M presents today for evaluation of tightening of foreskin. Previously saw Dr. Trinidad in 2018. Today complains of tightening of skin when he has an erection. Hx of diabetes, on Metformin. Had used Miconazole cream and Fluconazole 150mg in the past. Used Sildenafil which helps with erections. Patient was once prescribed Tamsulosin but never started. Notes over four years ago, he once saw blood in his urine and passed a stone, but has not had another episode since then. Notes he sometimes has pressure on his left flank. Also sometimes unable to hold his urine. Had an episode of fainting and and had negative cardiac workup. No FHx of prostate cancer or kidney stones. 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 \par \par 09/22/2021: Patient presents today for follow up. Last visit, patient was given Silodosin once daily with meals which has improved his urination. Previously had urinary urge leakage and frequency which has resolved. Erections and sexual desire are poor. Admits his diabetes is not controlled and currently taking insulin. Last HbA1c 5/12/21 was 10.8. \par \par 09/29/2021: The patient gave permission for a telephone visit. Scheduled as TEB but was not waiting in [facility] room for me, so I called on landline and told me he prefers telephone visit. I reviewed lab results from 9/22/21 visit. I explained the results in detail. His HbA1c 5/12/21 was 10.8, but HbA1c by Dr. Tinoco 8/16/21 had improved to 8.6 showing there is still opportunity for further improvement. 9/22/21 UA showed 500 mg/dL glucose. Patient at the time was not on any meds that affected glucose transport. I discussed better management of diabetes and asked he discuss with his PCP which he agrees. Urine culture showed no significant growth. Urine cytology negative for high grade urothelial carcinoma. PSA was 0.52, not on Finasteride or Dutasteride. No hx of smoking or tobacco use. No FHx of prostate cancer. Patient is from [place]. Urination during the day is good. Currently takes Silodosin 8mg, however still wakes 10-11x at night to urinate. He states he drinks a lot of water in the evenings.\par \par 10/06/2021: The patient gave permission for a telephone visit. Accompanied by wife. Last visit, was started on Desmopressin which has improved his nocturia. Wakes up 5x at night which is decreased from 10-11x, and feels better rested. Hx of diabetes. Reviewed 9/30/21 lab results show serum glucose elevated 267 and urine glucose >1000. Daytime urination is normal. Patient was pleased with the improvement. \par \par Instructed patient to increase Desmopressin 0.1mg from one tablet to tablets a day. \par \par Patient will go for blood work for BMP and serum osmolality, and urine osmolality, and schedule a telehealth appointment next week. If urine is a dilute, we will hold back on Desmopressin. \par \par Preparation, telehealth visit, and coordination of care took: 30 minutes

## 2021-10-10 NOTE — HISTORY OF PRESENT ILLNESS
[FreeTextEntry1] : 05/12/2021: Mr. Schulte is a 55y/o M presents today for evaluation of tightening of foreskin. Previously saw Dr. Trinidad in 2018. Today complains of tightening of skin when he has an erection. Hx of diabetes, on Metformin. Had used Miconazole cream and Fluconazole 150mg in the past. Used Sildenafil which helps with erections. Patient was once prescribed Tamsulosin but never started. Notes over four years ago, he once saw blood in his urine and passed a stone, but has not had another episode since then. Notes he sometimes has pressure on his left flank. Also sometimes unable to hold his urine. Had an episode of fainting and and had negative cardiac workup. No FHx of prostate cancer or kidney stones. 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 \par \par 09/22/2021: Patient presents today for follow up. Last visit, patient was given Silodosin once daily with meals which has improved his urination. Previously had urinary urge leakage and frequency which has resolved. Erections and sexual desire are poor. Admits his diabetes is not controlled and currently taking insulin. Last HbA1c 5/12/21 was 10.8. \par \par 09/29/2021: The patient gave permission for a telephone visit. Scheduled as TEB but was not waiting in [facility] room for me, so I called on landline and told me he prefers telephone visit. I reviewed lab results from 9/22/21 visit. I explained the results in detail. His HbA1c 5/12/21 was 10.8, but HbA1c by Dr. Tinoco 8/16/21 had improved to 8.6 showing there is still opportunity for further improvement. 9/22/21 UA showed 500 mg/dL glucose. Patient at the time was not on any meds that affected glucose transport. I discussed better management of diabetes and asked he discuss with his PCP which he agrees. Urine culture showed no significant growth. Urine cytology negative for high grade urothelial carcinoma. PSA was 0.52, not on Finasteride or Dutasteride. No hx of smoking or tobacco use. No FHx of prostate cancer. Patient is from [place]. Urination during the day is good. Currently takes Silodosin 8mg, however still wakes 10-11x at night to urinate. He states he drinks a lot of water in the evenings.\par \par 10/06/2021: The patient gave permission for a telephone visit. Accompanied by wife. Last visit, was started on Desmopressin which has improved his nocturia. Wakes up 5x at night which is decreased from 10-11x, and feels better rested. Hx of diabetes. Reviewed 9/30/21 lab results show serum glucose elevated 267 and urine glucose >1000. Daytime urination is normal. Patient was pleased with the improvement.

## 2021-10-10 NOTE — ADDENDUM
[FreeTextEntry1] : I, Jeanne Skinnerin, acted solely as a scribe for Dr. Anuel Medeiros on this date 10/06/2021.\par \par All medical record entries made by the Scribe were at my, Dr. Anuel Medeiros, direction and personally dictated by me on 10/06/2021. I have reviewed the chart and agree that the record accurately reflects my personal performance of the history, physical exam, assessment and plan.  I have also personally directed, reviewed and agreed with the chart.

## 2021-10-11 LAB
ANION GAP SERPL CALC-SCNC: 11 MMOL/L
BUN SERPL-MCNC: 12 MG/DL
CALCIUM SERPL-MCNC: 9.6 MG/DL
CHLORIDE SERPL-SCNC: 101 MMOL/L
CO2 SERPL-SCNC: 27 MMOL/L
CREAT SERPL-MCNC: 0.9 MG/DL
GLUCOSE SERPL-MCNC: 174 MG/DL
OSMOLALITY SERPL: 299 MOSMOL/KG
POTASSIUM SERPL-SCNC: 5.1 MMOL/L
SODIUM SERPL-SCNC: 139 MMOL/L

## 2021-10-19 ENCOUNTER — APPOINTMENT (OUTPATIENT)
Dept: UROLOGY | Facility: CLINIC | Age: 56
End: 2021-10-19
Payer: MEDICAID

## 2021-10-19 PROCEDURE — 99443: CPT

## 2021-10-19 NOTE — REVIEW OF SYSTEMS
[Painful [place]] : painful [place] [Genital bacterial infection] : genital bacterial infection [Negative] : Heme/Lymph

## 2021-10-24 LAB
ANDROST SERPL-MCNC: 59 NG/DL
ANION GAP SERPL CALC-SCNC: 12 MMOL/L
APPEARANCE: CLEAR
BACTERIA UR CULT: NORMAL
BACTERIA: NEGATIVE
BILIRUBIN URINE: NEGATIVE
BLOOD URINE: NEGATIVE
BUN SERPL-MCNC: 13 MG/DL
CHLORIDE SERPL-SCNC: 100 MMOL/L
CO2 SERPL-SCNC: 27 MMOL/L
COLOR: YELLOW
CREAT SERPL-MCNC: 0.84 MG/DL
DHEA-S SERPL-MCNC: 103 UG/DL
ESTIMATED AVERAGE GLUCOSE: 229 MG/DL
ESTRADIOL SERPL-MCNC: 40 PG/ML
ESTROGEN SERPL-MCNC: 86 PG/ML
FSH SERPL-MCNC: 16.6 IU/L
GLUCOSE QUALITATIVE U: ABNORMAL
GLUCOSE SERPL-MCNC: 225 MG/DL
HBA1C MFR BLD HPLC: 9.6 %
HYALINE CASTS: 0 /LPF
KETONES URINE: NEGATIVE
LEUKOCYTE ESTERASE URINE: NEGATIVE
LH SERPL-ACNC: 9.2 IU/L
MICROSCOPIC-UA: NORMAL
NITRITE URINE: NEGATIVE
PH URINE: 5.5
POTASSIUM SERPL-SCNC: 5.2 MMOL/L
PROGEST SERPL-MCNC: 0.1 NG/ML
PROLACTIN SERPL-MCNC: 7.4 NG/ML
PROTEIN URINE: ABNORMAL
RED BLOOD CELLS URINE: 1 /HPF
SHBG SERPL-SCNC: 31.6 NMOL/L
SODIUM SERPL-SCNC: 139 MMOL/L
SPECIFIC GRAVITY URINE: 1.03
SQUAMOUS EPITHELIAL CELLS: 1 /HPF
TESTOST BND SERPL-MCNC: 9.7 PG/ML
TESTOSTERONE TOTAL S: 392 NG/DL
URINE CYTOLOGY: NORMAL
UROBILINOGEN URINE: NORMAL
WHITE BLOOD CELLS URINE: 1 /HPF

## 2021-10-24 NOTE — ADDENDUM
[FreeTextEntry1] : I, Jeanne Skinnerin, acted solely as a scribe for Dr. Anuel Medeiros on this date 10/19/2021.\par \par All medical record entries made by the Scribe were at my, Dr. Anuel Medeiros, direction and personally dictated by me on 10/19/2021. I have reviewed the chart and agree that the record accurately reflects my personal performance of the history, physical exam, assessment and plan.  I have also personally directed, reviewed and agreed with the chart.

## 2021-10-24 NOTE — ASSESSMENT
[FreeTextEntry1] : 05/12/2021: Mr. Schulte is a 57y/o M presents today for evaluation of tightening of foreskin. Previously saw Dr. Trinidad in 2018. Today complains of tightening of skin when he has an erection. Hx of diabetes, on Metformin. Had used Miconazole cream and Fluconazole 150mg in the past. Used Sildenafil which helps with erections. Patient was once prescribed Tamsulosin but never started. Notes over four years ago, he once saw blood in his urine and passed a stone, but has not had another episode since then. Notes he sometimes has pressure on his left flank. Also sometimes unable to hold his urine. Had an episode of fainting and and had negative cardiac workup. No FHx of prostate cancer or kidney stones. 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 \par \par 09/22/2021: Patient presents today for follow up. Last visit, patient was given Silodosin once daily with meals which has improved his urination. Previously had urinary urge leakage and frequency which has resolved. Erections and sexual desire are poor. Admits his diabetes is not controlled and currently taking insulin. Last HbA1c 5/12/21 was 10.8. \par \par 09/29/2021: The patient gave permission for a telephone visit. Scheduled as TEB but was not waiting in [facility] room for me, so I called on landline and told me he prefers telephone visit. I reviewed lab results from 9/22/21 visit. I explained the results in detail. His HbA1c 5/12/21 was 10.8, but HbA1c by Dr. Tinoco 8/16/21 had improved to 8.6 showing there is still opportunity for further improvement. 9/22/21 UA showed 500 mg/dL glucose. Patient at the time was not on any meds that affected glucose transport. I discussed better management of diabetes and asked he discuss with his PCP which he agrees. Urine culture showed no significant growth. Urine cytology negative for high grade urothelial carcinoma. PSA was 0.52, not on Finasteride or Dutasteride. No hx of smoking or tobacco use. No FHx of prostate cancer. Patient is from [place]. Urination during the day is good. Currently takes Silodosin 8mg, however still wakes 10-11x at night to urinate. He states he drinks a lot of water in the evenings.\par \par 10/06/2021: The patient gave permission for a telephone visit. Accompanied by wife. Last visit, was started on Desmopressin which has improved his nocturia. Wakes up 5x at night which is decreased from 10-11x, and feels better rested. Hx of diabetes. Reviewed 9/30/21 lab results show serum glucose elevated 267 and urine glucose >1000. Daytime urination is normal. Patient was pleased with the improvement. \par \par 10/19/2021: The patient gave permission for a telehealth visit. I reached the patient at [phone]. Patient is accompanied by wife. Last visit, patient was increased on Desmopressin 0.1mg from one to two tablets a day which has helped with nocturia. Wakes 3x at night to urinate, which is decreased from 10-11x. \par \par I asked the patient to go for blood work at a [facility] site, and to schedule a telehealth appointment afterwards to review. Patient agrees to have telehealth appointment on Friday 10/29. \par \par Preparation, telehealth visit, and coordination of care took: 30 minutes

## 2021-10-24 NOTE — HISTORY OF PRESENT ILLNESS
[FreeTextEntry1] : 05/12/2021: Mr. Schulte is a 55y/o M presents today for evaluation of tightening of foreskin. Previously saw Dr. Trinidad in 2018. Today complains of tightening of skin when he has an erection. Hx of diabetes, on Metformin. Had used Miconazole cream and Fluconazole 150mg in the past. Used Sildenafil which helps with erections. Patient was once prescribed Tamsulosin but never started. Notes over four years ago, he once saw blood in his urine and passed a stone, but has not had another episode since then. Notes he sometimes has pressure on his left flank. Also sometimes unable to hold his urine. Had an episode of fainting and and had negative cardiac workup. No FHx of prostate cancer or kidney stones. 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 \par \par 09/22/2021: Patient presents today for follow up. Last visit, patient was given Silodosin once daily with meals which has improved his urination. Previously had urinary urge leakage and frequency which has resolved. Erections and sexual desire are poor. Admits his diabetes is not controlled and currently taking insulin. Last HbA1c 5/12/21 was 10.8. \par \par 09/29/2021: The patient gave permission for a telephone visit. Scheduled as TEB but was not waiting in [facility] room for me, so I called on landline and told me he prefers telephone visit. I reviewed lab results from 9/22/21 visit. I explained the results in detail. His HbA1c 5/12/21 was 10.8, but HbA1c by Dr. Tinoco 8/16/21 had improved to 8.6 showing there is still opportunity for further improvement. 9/22/21 UA showed 500 mg/dL glucose. Patient at the time was not on any meds that affected glucose transport. I discussed better management of diabetes and asked he discuss with his PCP which he agrees. Urine culture showed no significant growth. Urine cytology negative for high grade urothelial carcinoma. PSA was 0.52, not on Finasteride or Dutasteride. No hx of smoking or tobacco use. No FHx of prostate cancer. Patient is from [place]. Urination during the day is good. Currently takes Silodosin 8mg, however still wakes 10-11x at night to urinate. He states he drinks a lot of water in the evenings.\par \par 10/06/2021: The patient gave permission for a telephone visit. Accompanied by wife. Last visit, was started on Desmopressin which has improved his nocturia. Wakes up 5x at night which is decreased from 10-11x, and feels better rested. Hx of diabetes. Reviewed 9/30/21 lab results show serum glucose elevated 267 and urine glucose >1000. Daytime urination is normal. Patient was pleased with the improvement. \par \par 10/19/2021: The patient gave permission for a telehealth visit. I reached the patient at [phone]. Patient is accompanied by wife. Last visit, patient was increased on Desmopressin 0.1mg from one to two tablets a day which has helped with nocturia. Wakes 3x at night to urinate, which is decreased from 10-11x.

## 2021-10-25 LAB — DHEA SERPL-MCNC: 188 NG/DL

## 2021-10-27 LAB — ANDROSTANOLONE SERPL-MCNC: 40 NG/DL

## 2021-10-28 ENCOUNTER — NON-APPOINTMENT (OUTPATIENT)
Age: 56
End: 2021-10-28

## 2021-10-30 LAB
ANION GAP SERPL CALC-SCNC: 13 MMOL/L
APPEARANCE: CLEAR
BACTERIA: NEGATIVE
BILIRUBIN URINE: NEGATIVE
BLOOD URINE: NEGATIVE
BUN SERPL-MCNC: 18 MG/DL
CALCIUM SERPL-MCNC: 9.6 MG/DL
CHLORIDE SERPL-SCNC: 100 MMOL/L
CO2 SERPL-SCNC: 26 MMOL/L
COLOR: NORMAL
CREAT SERPL-MCNC: 0.92 MG/DL
GLUCOSE QUALITATIVE U: ABNORMAL
GLUCOSE SERPL-MCNC: 370 MG/DL
HYALINE CASTS: 0 /LPF
KETONES URINE: NEGATIVE
LEUKOCYTE ESTERASE URINE: NEGATIVE
MICROSCOPIC-UA: NORMAL
NITRITE URINE: NEGATIVE
OSMOLALITY SERPL: 311 MOSMOL/KG
PH URINE: 6
POTASSIUM SERPL-SCNC: 5.4 MMOL/L
PROTEIN URINE: NEGATIVE
RED BLOOD CELLS URINE: 0 /HPF
SODIUM SERPL-SCNC: 139 MMOL/L
SPECIFIC GRAVITY URINE: 1.03
SQUAMOUS EPITHELIAL CELLS: 0 /HPF
UROBILINOGEN URINE: NORMAL
WHITE BLOOD CELLS URINE: 0 /HPF

## 2021-11-02 ENCOUNTER — APPOINTMENT (OUTPATIENT)
Dept: UROLOGY | Facility: CLINIC | Age: 56
End: 2021-11-02

## 2021-11-03 ENCOUNTER — APPOINTMENT (OUTPATIENT)
Dept: UROLOGY | Facility: CLINIC | Age: 56
End: 2021-11-03
Payer: MEDICAID

## 2021-11-03 DIAGNOSIS — R35.1 NOCTURIA: ICD-10-CM

## 2021-11-03 DIAGNOSIS — N13.9 BENIGN PROSTATIC HYPERPLASIA WITH LOWER URINARY TRACT SYMPMS: ICD-10-CM

## 2021-11-03 DIAGNOSIS — N40.1 BENIGN PROSTATIC HYPERPLASIA WITH LOWER URINARY TRACT SYMPMS: ICD-10-CM

## 2021-11-03 DIAGNOSIS — N52.9 MALE ERECTILE DYSFUNCTION, UNSPECIFIED: ICD-10-CM

## 2021-11-03 PROCEDURE — 99443: CPT

## 2021-11-03 NOTE — REVIEW OF SYSTEMS
[Painful [place]] : painful [place] [Genital bacterial infection] : genital bacterial infection [Negative] : Heme/Lymph

## 2021-11-06 LAB — OSMOLALITY UR: 881 MOSM/KG

## 2021-11-07 PROBLEM — N52.9 ERECTILE DYSFUNCTION OF ORGANIC ORIGIN: Status: ACTIVE | Noted: 2018-04-17

## 2021-11-07 PROBLEM — N40.1 BENIGN LOCALIZED HYPERPLASIA OF PROSTATE WITH URINARY OBSTRUCTION AND LOWER URINARY TRACT SYMPTOMS: Status: ACTIVE | Noted: 2021-09-22

## 2021-11-07 NOTE — ASSESSMENT
[FreeTextEntry1] : 05/12/2021: Mr. Schulte is a 55y/o M presents today for evaluation of tightening of foreskin. Previously saw Dr. Trinidad in 2018. Today complains of tightening of skin when he has an erection. Hx of diabetes, on Metformin. Had used Miconazole cream and Fluconazole 150mg in the past. Used Sildenafil which helps with erections. Patient was once prescribed Tamsulosin but never started. Notes over four years ago, he once saw blood in his urine and passed a stone, but has not had another episode since then. Notes he sometimes has pressure on his left flank. Also sometimes unable to hold his urine. Had an episode of fainting and and had negative cardiac workup. No FHx of prostate cancer or kidney stones. 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 \par \par 09/22/2021: Patient presents today for follow up. Last visit, patient was given Silodosin once daily with meals which has improved his urination. Previously had urinary urge leakage and frequency which has resolved. Erections and sexual desire are poor. Admits his diabetes is not controlled and currently taking insulin. Last HbA1c 5/12/21 was 10.8. \par \par 09/29/2021: The patient gave permission for a telephone visit. Scheduled as TEB but was not waiting in [facility] room for me, so I called on landline and told me he prefers telephone visit. I reviewed lab results from 9/22/21 visit. I explained the results in detail. His HbA1c 5/12/21 was 10.8, but HbA1c by Dr. Tinoco 8/16/21 had improved to 8.6 showing there is still opportunity for further improvement. 9/22/21 UA showed 500 mg/dL glucose. Patient at the time was not on any meds that affected glucose transport. I discussed better management of diabetes and asked he discuss with his PCP which he agrees. Urine culture showed no significant growth. Urine cytology negative for high grade urothelial carcinoma. PSA was 0.52, not on Finasteride or Dutasteride. No hx of smoking or tobacco use. No FHx of prostate cancer. Patient is from [place]. Urination during the day is good. Currently takes Silodosin 8mg, however still wakes 10-11x at night to urinate. He states he drinks a lot of water in the evenings.\par \par 10/06/2021: The patient gave permission for a telephone visit. Accompanied by wife. Last visit, was started on Desmopressin which has improved his nocturia. Wakes up 5x at night which is decreased from 10-11x, and feels better rested. Hx of diabetes. Reviewed 9/30/21 lab results show serum glucose elevated 267 and urine glucose >1000. Daytime urination is normal. Patient was pleased with the improvement. \par \par 10/19/2021: The patient gave permission for a telehealth visit. I reached the patient at [phone]. Patient is accompanied by wife. Last visit, patient was increased on Desmopressin 0.1mg from one to two tablets a day which has helped with nocturia. Wakes 3x at night to urinate, which is decreased from 10-11x. \par \par 11/03/2021: The patient gave permission for a telehealth visit. Currently on Desmopressin 0.2mg once daily. Reviewed 10/24/21 lab work which showed elevated osmolality of 311, elevated glucose 370, and normal sodium 139. Has been sleeping well. Wakes 2x at night to urinate, which is decreased from 12x before starting medication. Continues Silodosin 8mg once daily with meals. Patient carries urine bottle on long drives but does not use it anymore. Does have sexual desire but erections are poor. \par \par Osmolality is elevated due to glycosuria. \par \par Continue Desmopressin 0.2mg once daily at bedtime. \par \par I prescribed the patient Tadalafil 5mg once daily for urination and erections. If erections are not adequate, he may take an additional 5mg for a total of 10mg on days he will engage in sexual activity. I informed the patient that they may experience tingling of the skin, headache, warm flushed feeling, heartburn, backache, and on rare occasion, visual or hearing disturbances. Informed the patient how to use [employer]Rx to  Tadalafil at a [employer] or [employer] at a discounted price. Patient were pleased and laughing. \par \par Patient will only have Monday, Wednesday, or Friday availability. \par I have asked the patient to go for blood work in 3 weeks and schedule a telehealth appointment 1 week later to review results. \par \par Preparation, telehealth visit, and coordination of care took: 21 minutes

## 2021-11-07 NOTE — ADDENDUM
[FreeTextEntry1] : I, Jeanne Skinnerin, acted solely as a scribe for Dr. Anuel Medeiros on this date 11/03/2021.\par \par All medical record entries made by the Scribe were at my, Dr. Anuel eMdeiros, direction and personally dictated by me on 11/03/2021. I have reviewed the chart and agree that the record accurately reflects my personal performance of the history, physical exam, assessment and plan.  I have also personally directed, reviewed and agreed with the chart.

## 2021-11-07 NOTE — HISTORY OF PRESENT ILLNESS
[FreeTextEntry1] : 05/12/2021: Mr. Schulte is a 57y/o M presents today for evaluation of tightening of foreskin. Previously saw Dr. Trinidad in 2018. Today complains of tightening of skin when he has an erection. Hx of diabetes, on Metformin. Had used Miconazole cream and Fluconazole 150mg in the past. Used Sildenafil which helps with erections. Patient was once prescribed Tamsulosin but never started. Notes over four years ago, he once saw blood in his urine and passed a stone, but has not had another episode since then. Notes he sometimes has pressure on his left flank. Also sometimes unable to hold his urine. Had an episode of fainting and and had negative cardiac workup. No FHx of prostate cancer or kidney stones. 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 \par \par 09/22/2021: Patient presents today for follow up. Last visit, patient was given Silodosin once daily with meals which has improved his urination. Previously had urinary urge leakage and frequency which has resolved. Erections and sexual desire are poor. Admits his diabetes is not controlled and currently taking insulin. Last HbA1c 5/12/21 was 10.8. \par \par 09/29/2021: The patient gave permission for a telephone visit. Scheduled as TEB but was not waiting in [facility] room for me, so I called on landline and told me he prefers telephone visit. I reviewed lab results from 9/22/21 visit. I explained the results in detail. His HbA1c 5/12/21 was 10.8, but HbA1c by Dr. Tinoco 8/16/21 had improved to 8.6 showing there is still opportunity for further improvement. 9/22/21 UA showed 500 mg/dL glucose. Patient at the time was not on any meds that affected glucose transport. I discussed better management of diabetes and asked he discuss with his PCP which he agrees. Urine culture showed no significant growth. Urine cytology negative for high grade urothelial carcinoma. PSA was 0.52, not on Finasteride or Dutasteride. No hx of smoking or tobacco use. No FHx of prostate cancer. Patient is from [place]. Urination during the day is good. Currently takes Silodosin 8mg, however still wakes 10-11x at night to urinate. He states he drinks a lot of water in the evenings.\par \par 10/06/2021: The patient gave permission for a telephone visit. Accompanied by wife. Last visit, was started on Desmopressin which has improved his nocturia. Wakes up 5x at night which is decreased from 10-11x, and feels better rested. Hx of diabetes. Reviewed 9/30/21 lab results show serum glucose elevated 267 and urine glucose >1000. Daytime urination is normal. Patient was pleased with the improvement. \par \par 10/19/2021: The patient gave permission for a telehealth visit. I reached the patient at [phone]. Patient is accompanied by wife. Last visit, patient was increased on Desmopressin 0.1mg from one to two tablets a day which has helped with nocturia. Wakes 3x at night to urinate, which is decreased from 10-11x. \par \par 11/03/2021: The patient gave permission for a telehealth visit. Currently on Desmopressin 0.2mg once daily. Reviewed 10/24/21 lab work which showed elevated osmolality of 311, elevated glucose 370, and normal sodium 139. Has been sleeping well. Wakes 2x at night to urinate, which is decreased from 12x before starting medication. Continues Silodosin 8mg once daily with meals. Patient carries urine bottle on long drives but does not use it anymore. Does have sexual desire but erections are poor.

## 2021-12-10 LAB
ANION GAP SERPL CALC-SCNC: 12 MMOL/L
BUN SERPL-MCNC: 10 MG/DL
CALCIUM SERPL-MCNC: 9.6 MG/DL
CHLORIDE SERPL-SCNC: 100 MMOL/L
CO2 SERPL-SCNC: 27 MMOL/L
CREAT SERPL-MCNC: 0.92 MG/DL
GLUCOSE SERPL-MCNC: 272 MG/DL
OSMOLALITY SERPL: 299 MOSMOL/KG
POTASSIUM SERPL-SCNC: 5.1 MMOL/L
SODIUM SERPL-SCNC: 138 MMOL/L

## 2022-09-23 ENCOUNTER — RX CHANGE (OUTPATIENT)
Age: 57
End: 2022-09-23

## 2022-09-24 RX ORDER — DESMOPRESSIN ACETATE 0.2 MG/1
0.2 TABLET ORAL
Qty: 90 | Refills: 0 | Status: ACTIVE | COMMUNITY
Start: 2021-09-29 | End: 1900-01-01

## 2022-12-19 ENCOUNTER — OFFICE (OUTPATIENT)
Dept: URBAN - METROPOLITAN AREA CLINIC 34 | Facility: CLINIC | Age: 57
Setting detail: OPHTHALMOLOGY
End: 2022-12-19
Payer: COMMERCIAL

## 2022-12-19 VITALS — HEIGHT: 60 IN

## 2022-12-19 DIAGNOSIS — H17.9: ICD-10-CM

## 2022-12-19 DIAGNOSIS — H16.223: ICD-10-CM

## 2022-12-19 DIAGNOSIS — H25.13: ICD-10-CM

## 2022-12-19 DIAGNOSIS — E11.9: ICD-10-CM

## 2022-12-19 DIAGNOSIS — H11.153: ICD-10-CM

## 2022-12-19 DIAGNOSIS — H00.022: ICD-10-CM

## 2022-12-19 DIAGNOSIS — H00.025: ICD-10-CM

## 2022-12-19 DIAGNOSIS — H35.033: ICD-10-CM

## 2022-12-19 PROCEDURE — 92014 COMPRE OPH EXAM EST PT 1/>: CPT | Performed by: OPHTHALMOLOGY

## 2022-12-19 ASSESSMENT — SPHEQUIV_DERIVED
OD_SPHEQUIV: 0.875
OD_SPHEQUIV: 0.75
OS_SPHEQUIV: 1

## 2022-12-19 ASSESSMENT — CONFRONTATIONAL VISUAL FIELD TEST (CVF)
OS_FINDINGS: FULL
OD_FINDINGS: FULL

## 2022-12-19 ASSESSMENT — LID EXAM ASSESSMENTS
OD_COMMENTS: MISSING GLANDS
OD_MEIBOMITIS: RLL 1+ 2+
OS_COMMENTS: MISSING GLANDS
OS_MEIBOMITIS: LLL 1+ 2+

## 2022-12-19 ASSESSMENT — REFRACTION_CURRENTRX
OS_OVR_VA: 20/
OD_OVR_VA: 20/

## 2022-12-19 ASSESSMENT — REFRACTION_MANIFEST
OS_CYLINDER: +1.00
OD_AXIS: 006
OD_CYLINDER: +0.75
OS_VA1: 20/40-
OS_SPHERE: +0.50
OD_VA1: 20/25-2
OS_AXIS: 176
OD_SPHERE: +0.50

## 2022-12-19 ASSESSMENT — AXIALLENGTH_DERIVED
OD_AL: 23.8535
OD_AL: 23.8038
OS_AL: 23.7544

## 2022-12-19 ASSESSMENT — VISUAL ACUITY
OS_BCVA: 20/30-2
OD_BCVA: 20/20-1

## 2022-12-19 ASSESSMENT — REFRACTION_AUTOREFRACTION
OD_SPHERE: +0.25
OD_CYLINDER: +1.00
OD_AXIS: 013
OS_SPHERE: PLANO
OS_CYLINDER: +1.75
OS_AXIS: 173

## 2022-12-19 ASSESSMENT — KERATOMETRY
OD_AXISANGLE_DEGREES: 075
OS_AXISANGLE_DEGREES: 090
OD_K1POWER_DIOPTERS: 41.50
OD_K2POWER_DIOPTERS: 42.50
OS_K2POWER_DIOPTERS: 42.00
OS_K1POWER_DIOPTERS: 42.00
METHOD_AUTO_MANUAL: AUTO

## 2022-12-19 ASSESSMENT — TONOMETRY
OS_IOP_MMHG: 16
OD_IOP_MMHG: 20

## 2023-09-06 ENCOUNTER — OFFICE (OUTPATIENT)
Dept: URBAN - METROPOLITAN AREA CLINIC 34 | Facility: CLINIC | Age: 58
Setting detail: OPHTHALMOLOGY
End: 2023-09-06
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
## 2023-09-06 PROCEDURE — NO SHOW FE NO SHOW FEE: Performed by: OPHTHALMOLOGY

## 2024-01-10 ENCOUNTER — OFFICE (OUTPATIENT)
Dept: URBAN - METROPOLITAN AREA CLINIC 34 | Facility: CLINIC | Age: 59
Setting detail: OPHTHALMOLOGY
End: 2024-01-10
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
## 2024-01-10 PROCEDURE — NO SHOW FE NO SHOW FEE: Performed by: OPHTHALMOLOGY

## 2025-02-17 ENCOUNTER — DOCTOR'S OFFICE (OUTPATIENT)
Facility: LOCATION | Age: 60
Setting detail: OPHTHALMOLOGY
End: 2025-02-17
Payer: COMMERCIAL

## 2025-02-17 DIAGNOSIS — H35.362: ICD-10-CM

## 2025-02-17 DIAGNOSIS — E11.9: ICD-10-CM

## 2025-02-17 DIAGNOSIS — H25.13: ICD-10-CM

## 2025-02-17 DIAGNOSIS — H17.9: ICD-10-CM

## 2025-02-17 PROCEDURE — 92014 COMPRE OPH EXAM EST PT 1/>: CPT | Performed by: OPHTHALMOLOGY

## 2025-02-17 ASSESSMENT — REFRACTION_MANIFEST
OD_SPHERE: -0.25
OD_AXIS: 130
OS_SPHERE: +0.50
OD_VA1: 20/25-2
OS_CYLINDER: +1.00
OD_AXIS: 006
OS_AXIS: 175
OS_SPHERE: +0.50
OD_CYLINDER: +0.25
OS_VA1: 20/30
OD_SPHERE: +0.50
OS_VA1: 20/40-
OU_VA: 20/30
OS_CYLINDER: +2.25
OS_AXIS: 176
OD_VA1: 20/30
OD_CYLINDER: +0.75

## 2025-02-17 ASSESSMENT — LID EXAM ASSESSMENTS
OS_COMMENTS: MISSING GLANDS
OD_MEIBOMITIS: RLL 1+ 2+
OD_COMMENTS: MISSING GLANDS
OS_MEIBOMITIS: LLL 1+ 2+

## 2025-02-17 ASSESSMENT — REFRACTION_AUTOREFRACTION
OD_SPHERE: -0.50
OS_CYLINDER: +1.00
OS_AXIS: 175
OD_AXIS: 127
OS_SPHERE: +0.50
OD_CYLINDER: +0.75

## 2025-02-17 ASSESSMENT — KERATOMETRY
METHOD_AUTO_MANUAL: AUTO
OD_AXISANGLE_DEGREES: 079
OS_K2POWER_DIOPTERS: 42.25
OS_AXISANGLE_DEGREES: 090
OS_K1POWER_DIOPTERS: 42.25
OD_K1POWER_DIOPTERS: 41.50
OD_K2POWER_DIOPTERS: 41.75

## 2025-02-17 ASSESSMENT — CONFRONTATIONAL VISUAL FIELD TEST (CVF)
OS_FINDINGS: FULL
OD_FINDINGS: FULL

## 2025-02-17 ASSESSMENT — REFRACTION_CURRENTRX
OD_OVR_VA: 20/
OS_OVR_VA: 20/

## 2025-02-17 ASSESSMENT — TONOMETRY: OS_IOP_MMHG: 16

## 2025-02-17 ASSESSMENT — VISUAL ACUITY
OS_BCVA: 20/40 -1
OD_BCVA: 20/40

## 2025-04-16 ENCOUNTER — OUTPATIENT (OUTPATIENT)
Dept: OUTPATIENT SERVICES | Facility: HOSPITAL | Age: 60
LOS: 1 days | End: 2025-04-16
Payer: COMMERCIAL

## 2025-04-16 ENCOUNTER — APPOINTMENT (OUTPATIENT)
Dept: ULTRASOUND IMAGING | Facility: CLINIC | Age: 60
End: 2025-04-16
Payer: COMMERCIAL

## 2025-04-16 DIAGNOSIS — R30.0 DYSURIA: ICD-10-CM

## 2025-04-16 DIAGNOSIS — Z98.890 OTHER SPECIFIED POSTPROCEDURAL STATES: Chronic | ICD-10-CM

## 2025-04-16 PROCEDURE — 76857 US EXAM PELVIC LIMITED: CPT | Mod: 26

## 2025-04-16 PROCEDURE — 76857 US EXAM PELVIC LIMITED: CPT
